# Patient Record
Sex: MALE | Race: BLACK OR AFRICAN AMERICAN | NOT HISPANIC OR LATINO | URBAN - METROPOLITAN AREA
[De-identification: names, ages, dates, MRNs, and addresses within clinical notes are randomized per-mention and may not be internally consistent; named-entity substitution may affect disease eponyms.]

---

## 2017-10-09 NOTE — PATIENT DISCUSSION
DRY EYES : Discussed with patient the importance of keeping the eye moist and the symptoms associated with dry eyes including blurry vision, tearing, burning, and gladys sensation. Advised patient to minimize use of any fans blowing directly on the face. Advised patient to continue with artificial tears 2-3 times daily.

## 2022-08-20 PROBLEM — I10 PRIMARY HYPERTENSION: Status: ACTIVE | Noted: 2022-08-20

## 2022-08-20 PROBLEM — M54.2 CERVICALGIA: Status: ACTIVE | Noted: 2022-08-20

## 2022-10-20 ENCOUNTER — COMPREHENSIVE EXAM (OUTPATIENT)
Dept: URBAN - METROPOLITAN AREA CLINIC 11 | Facility: CLINIC | Age: 67
End: 2022-10-20

## 2022-10-20 DIAGNOSIS — H25.12: ICD-10-CM

## 2022-10-20 DIAGNOSIS — H43.312: ICD-10-CM

## 2022-10-20 DIAGNOSIS — H43.812: ICD-10-CM

## 2022-10-20 DIAGNOSIS — Z90.01: ICD-10-CM

## 2022-10-20 DIAGNOSIS — H35.412: ICD-10-CM

## 2022-10-20 PROCEDURE — 99214 OFFICE O/P EST MOD 30 MIN: CPT

## 2022-10-20 PROCEDURE — 92201 OPSCPY EXTND RTA DRAW UNI/BI: CPT

## 2022-10-20 ASSESSMENT — TONOMETRY: OS_IOP_MMHG: 19

## 2022-10-20 ASSESSMENT — VISUAL ACUITY: OS_CC: 20/20-1

## 2022-10-27 PROBLEM — F41.9 ANXIETY: Status: ACTIVE | Noted: 2022-10-27

## 2022-10-27 PROBLEM — E78.5 HYPERLIPIDEMIA: Status: ACTIVE | Noted: 2022-10-27
